# Patient Record
Sex: FEMALE | Race: AMERICAN INDIAN OR ALASKA NATIVE
[De-identification: names, ages, dates, MRNs, and addresses within clinical notes are randomized per-mention and may not be internally consistent; named-entity substitution may affect disease eponyms.]

---

## 2018-01-17 NOTE — EMERGENCY DEPARTMENT REPORT
Eye Injury/Foreign Body





- HPI


Duration: 1 Day


Eye Location: Left


Severity: Mild


Tetanus Status: Up to Date


Eye Symptoms: Eye Pain: No, Blurred Vision: No, Eye Redness: No, Grinding/

Hammering Metal: No, Used Eye Protection: No, Contact Lens Use: No, Recalls 

Injury: No, Photophobia: No


Other History: Patient is a 17-year-old female presents to ED complaining of 

swelling around her eyes times one day.  Patient states yesterday she noticed 

the around her eyes were swollen.  Patient has a sister red and itchy.  She 

states she thinks something she touched Eye.  Patient denies any blurry vision, 

any injuries to the eye or trauma to the eye.





ED Review of Systems


ROS: 


Stated complaint: LEFT EYE PAIN


Other details as noted in HPI





Constitutional: denies: chills, fever


Eyes: denies: eye pain, eye discharge, vision change


ENT: denies: ear pain, throat pain


Respiratory: denies: cough, shortness of breath, wheezing


Cardiovascular: denies: chest pain, palpitations


Endocrine: no symptoms reported


Gastrointestinal: denies: abdominal pain, nausea, diarrhea


Genitourinary: denies: urgency, dysuria, discharge


Musculoskeletal: denies: back pain, joint swelling, arthralgia


Skin: denies: rash, lesions


Neurological: denies: headache, weakness, paresthesias


Psychiatric: denies: anxiety, depression


Hematological/Lymphatic: denies: easy bleeding, easy bruising





ED Past Medical Hx





- Past Medical History


Previous Medical History?: No





- Surgical History


Past Surgical History?: No





- Social History


Smoking Status: Never Smoker


Substance Use Type: Alcohol





- Medications


Home Medications: 


 Home Medications











 Medication  Instructions  Recorded  Confirmed  Last Taken  Type


 


Ketotifen Fumarate [Allergy Eye 10 ml OP TID #10 ml 01/17/18  Unknown Rx





Drops]     


 


diphenhydrAMINE [Benadryl CAP] 25 mg PO QHS PRN #20 capsule 01/17/18  Unknown Rx














Eye Injury Exam





- Exam


General: 


Vital signs noted. No distress. Alert and acting appropriately.


GENERAL: Alert and oriented x3, no apparent distress, Normal Gait, atraumatic.


HEAD: Head is normocephalic and a-traumatic.


EYES: Extra ocular muscles are intact. Pupils are equal, round, and reactive to 

light and accommodation.  Sclera is white bilaterally, conjunctiva pink and 

moist not erythematous.  There is orbital swelling, tender to palpation around 

the left eye.  Vision is intact bilaterally





SKIN:  Warm and dry, No lesions, No ulceration or induration present.











ED Course


 Vital Signs











  01/16/18





  18:57


 


Temperature 98.7 F


 


Pulse Rate 97 H


 


Respiratory 16





Rate 


 


Blood Pressure 110/64


 


O2 Sat by Pulse 99





Oximetry 














ED Medical Decision Making





- Medical Decision Making


17-year-old female presents with a orbital cellulitis


ED course: Vision is intact throughout ED stay


I discussed the patient to use medication as prescribed.  I discussed the 

patient was given eyedrops.


Meds discuss admission for primary care physician in 3-4 days.


Vital signs are normal.  Patient is in no acute respiratory distress. 


 Discussed with patient if symptoms worsen or new symptoms arise to return to 

ED immediately





Critical care attestation.: 


If time is entered above; I have spent that time in minutes in the direct care 

of this critically ill patient, excluding procedure time.








ED Disposition


Clinical Impression: 


 Orbital swelling, Orbital cellulitis on left





Disposition: DC-01 TO HOME OR SELFCARE


Is pt being admited?: No


Does the pt Need Aspirin: No


Condition: Stable


Instructions:  Orbital Cellulitis (ED)


Additional Instructions: 


Make sure to follow up with the primary care physician as discussed.


Take all your medications as you've been prescribed.


If you have any worsening symptoms or develop new symptoms please return to ED 

immediately.


Prescriptions: 


diphenhydrAMINE [Benadryl CAP] 25 mg PO QHS PRN #20 capsule


 PRN Reason: Itching


Ketotifen Fumarate [Allergy Eye Drops] 10 ml OP TID #10 ml


Referrals: 


DEVIKA DOTSON MD [Primary Care Provider] - 3-5 Days


[facility] [Outside] - 3-5 Days


[facility] [Outside] - 3-5 Days


AGNES DOOLEY MD [Staff Physician] - 3-5 Days


Forms:  Work/School Release Form(ED)


Time of Disposition: 04:32

## 2021-12-10 ENCOUNTER — HOSPITAL ENCOUNTER (EMERGENCY)
Dept: HOSPITAL 5 - ED | Age: 21
Discharge: HOME | End: 2021-12-10
Payer: MEDICAID

## 2021-12-10 VITALS — DIASTOLIC BLOOD PRESSURE: 78 MMHG | SYSTOLIC BLOOD PRESSURE: 120 MMHG

## 2021-12-10 DIAGNOSIS — Z11.3: Primary | ICD-10-CM

## 2021-12-10 PROCEDURE — 96372 THER/PROPH/DIAG INJ SC/IM: CPT

## 2021-12-10 PROCEDURE — 81025 URINE PREGNANCY TEST: CPT

## 2021-12-10 PROCEDURE — 99283 EMERGENCY DEPT VISIT LOW MDM: CPT

## 2021-12-10 NOTE — EMERGENCY DEPARTMENT REPORT
ED Female  HPI





- General


Chief complaint: Urogenital-Female


Stated complaint: VAGINAL PAIN


Time Seen by Provider: 12/10/21 04:16


Source: patient


Mode of arrival: Ambulatory


Limitations: No Limitations





- History of Present Illness


Initial comments: 


Patient presents for STD exposure states partner advised he had HSV.  Concern 

for gonorrhea as well.  Patient symptoms include bilateral flank pain denies 

discharge however has malodor.  Patient denies nausea vomiting there is been no 

fever or chills.  Last menstrual cycle 2 weeks ago.  Patient denies lesions or 

open wounds or rash.  There is no dysuria frequency urgency or hematuria.





MD Complaint: possible STD





- Related Data


                                  Previous Rx's











 Medication  Instructions  Recorded  Last Taken  Type


 


Ketotifen Fumarate [Allergy Eye 10 ml OP TID #10 ml 01/17/18 Unknown Rx





Drops]    


 


diphenhydrAMINE [Benadryl CAP] 25 mg PO QHS PRN #20 capsule 01/17/18 Unknown Rx


 


Azithromycin [Zithromax Z-NICK] 250 mg PO DAILY #6 tablet 12/13/18 Unknown Rx


 


Benzonatate [Tessalon Perle] 100 mg PO Q8H PRN #20 capsule 12/13/18 Unknown Rx


 


Ibuprofen [Motrin] 600 mg PO Q8H PRN #20 tablet 12/13/18 Unknown Rx


 


Tobramycin [Tobrex] 1 drop OS Q4H #1 bottle 05/10/19 Unknown Rx


 


metroNIDAZOLE [Flagyl] 500 mg PO BID 7 Days #14 tablet 12/10/21 Unknown Rx











                                    Allergies











Allergy/AdvReac Type Severity Reaction Status Date / Time


 


No Known Allergies Allergy   Verified 05/11/19 10:00














ED Review of Systems


ROS: 


Stated complaint: VAGINAL PAIN


Other details as noted in HPI





Constitutional: denies: chills, fever


Eyes: denies: eye pain, eye discharge, vision change


ENT: denies: ear pain, throat pain


Respiratory: denies: cough, shortness of breath, wheezing


Cardiovascular: as per HPI


Endocrine: no symptoms reported


Gastrointestinal: denies: abdominal pain, nausea, vomiting, diarrhea


Genitourinary: denies: urgency, dysuria, discharge


Musculoskeletal: denies: back pain, joint swelling, arthralgia


Skin: denies: rash, lesions


Neurological: denies: headache, weakness, paresthesias


Psychiatric: denies: anxiety, depression


Hematological/Lymphatic: denies: easy bleeding, easy bruising





ED Past Medical Hx





- Past Medical History


Previous Medical History?: No





- Surgical History


Past Surgical History?: No





- Social History


Smoking Status: Never Smoker


Substance Use Type: None





- Medications


Home Medications: 


                                Home Medications











 Medication  Instructions  Recorded  Confirmed  Last Taken  Type


 


Ketotifen Fumarate [Allergy Eye 10 ml OP TID #10 ml 01/17/18  Unknown Rx





Drops]     


 


diphenhydrAMINE [Benadryl CAP] 25 mg PO QHS PRN #20 capsule 01/17/18  Unknown Rx


 


Azithromycin [Zithromax Z-NICK] 250 mg PO DAILY #6 tablet 12/13/18  Unknown Rx


 


Benzonatate [Tessalon Perle] 100 mg PO Q8H PRN #20 capsule 12/13/18  Unknown Rx


 


Ibuprofen [Motrin] 600 mg PO Q8H PRN #20 tablet 12/13/18  Unknown Rx


 


Tobramycin [Tobrex] 1 drop OS Q4H #1 bottle 05/10/19  Unknown Rx


 


metroNIDAZOLE [Flagyl] 500 mg PO BID 7 Days #14 tablet 12/10/21  Unknown Rx














ED Physical Exam





- General


Limitations: No Limitations


General appearance: alert, in no apparent distress





- Head


Head exam: Present: normocephalic, normal inspection





- Eye


Eye exam: Present: normal appearance, PERRL, EOMI


Pupils: Present: normal accommodation





- ENT


ENT exam: Present: mucous membranes moist





- Neck


Neck exam: Present: normal inspection, full ROM.  Absent: tenderness





- Respiratory


Respiratory exam: Present: normal lung sounds bilaterally.  Absent: respiratory 

distress, wheezes, stridor





- Cardiovascular


Cardiovascular Exam: Present: regular rate, normal rhythm, normal heart sounds. 

 Absent: systolic murmur, diastolic murmur, rubs, gallop





- GI/Abdominal


GI/Abdominal exam: Present: soft, normal bowel sounds.  Absent: distended, 

tenderness, guarding, rebound, rigid, bruit, hernia





- Rectal


Rectal exam: Present: deferred





- 


External exam: Present: other (Defered per patient )





- Extremities Exam


Extremities exam: Present: normal inspection, full ROM, normal capillary refill





- Back Exam


Back exam: Present: normal inspection, full ROM.  Absent: CVA tenderness (R), 

CVA tenderness (L)





- Neurological Exam


Neurological exam: Present: alert, oriented X3, normal gait





- Psychiatric


Psychiatric exam: Present: normal affect, normal mood





- Skin


Skin exam: Present: warm, dry, intact, normal color.  Absent: rash





ED Course





                                   Vital Signs











  12/10/21





  03:00


 


Temperature 98.9 F


 


Pulse Rate 81


 


Respiratory 16





Rate 


 


Blood Pressure 120/78





[Left] 


 


O2 Sat by Pulse 100





Oximetry 














ED Medical Decision Making





- Medical Decision Making


Patient treated for STI, patient currently has no rash or open lesions or sores.

  Patient will be DC'd home with prescription, follow-up with health department 

for HSV and HIV screening, patient is currently awake alert oriented x3 

tolerating p.o. intake there is no abdominal pain, patient denies vaginal 

discharge.  There is been no fever or chills.  Patient verbalizes agreement and 

understanding of discharge plan.





Critical care attestation.: 


If time is entered above; I have spent that time in minutes in the direct care 

of this critically ill patient, excluding procedure time.








ED Disposition


Clinical Impression: 


 STI (sexually transmitted infection)





Disposition: 01 HOME / SELF CARE / HOMELESS


Is pt being admited?: No


Does the pt Need Aspirin: No


Condition: Stable


Instructions:  Safe Sex, Genital Herpes


Additional Instructions: 


Take medications as prescribed, follow-up with health department for HSV and HIV

 testing


Prescriptions: 


metroNIDAZOLE [Flagyl] 500 mg PO BID 7 Days #14 tablet


Referrals: 


[facility] [Outside] - 3-5 Days


Forms:  Work/School Release Form(ED)


Time of Disposition: 00:00